# Patient Record
Sex: MALE | Race: WHITE | NOT HISPANIC OR LATINO | Employment: FULL TIME | URBAN - METROPOLITAN AREA
[De-identification: names, ages, dates, MRNs, and addresses within clinical notes are randomized per-mention and may not be internally consistent; named-entity substitution may affect disease eponyms.]

---

## 2017-09-27 ENCOUNTER — HISTORICAL (OUTPATIENT)
Dept: RADIOLOGY | Facility: HOSPITAL | Age: 47
End: 2017-09-27

## 2017-09-27 LAB — POC CREATININE: 1.3 MG/DL (ref 0.6–1.3)

## 2018-07-09 ENCOUNTER — HISTORICAL (OUTPATIENT)
Dept: ADMINISTRATIVE | Facility: HOSPITAL | Age: 48
End: 2018-07-09

## 2018-07-09 LAB — RAPID GROUP A STREP (OHS): NEGATIVE

## 2019-08-23 ENCOUNTER — HISTORICAL (OUTPATIENT)
Dept: LAB | Facility: HOSPITAL | Age: 49
End: 2019-08-23

## 2019-08-23 ENCOUNTER — HISTORICAL (OUTPATIENT)
Dept: ADMINISTRATIVE | Facility: HOSPITAL | Age: 49
End: 2019-08-23

## 2019-08-23 LAB — TESTOST SERPL-MCNC: 692 NG/DL (ref 300–1060)

## 2019-08-27 ENCOUNTER — HISTORICAL (OUTPATIENT)
Dept: CARDIOLOGY | Facility: HOSPITAL | Age: 49
End: 2019-08-27

## 2020-01-10 ENCOUNTER — HISTORICAL (OUTPATIENT)
Dept: LAB | Facility: HOSPITAL | Age: 50
End: 2020-01-10

## 2020-01-10 ENCOUNTER — HISTORICAL (OUTPATIENT)
Dept: ADMINISTRATIVE | Facility: HOSPITAL | Age: 50
End: 2020-01-10

## 2020-01-10 LAB — TESTOST SERPL-MCNC: 514 NG/DL (ref 300–1060)

## 2020-03-02 ENCOUNTER — HISTORICAL (OUTPATIENT)
Dept: RADIOLOGY | Facility: HOSPITAL | Age: 50
End: 2020-03-02

## 2020-10-05 ENCOUNTER — HISTORICAL (OUTPATIENT)
Dept: ADMINISTRATIVE | Facility: HOSPITAL | Age: 50
End: 2020-10-05

## 2020-10-05 LAB
ABS NEUT (OLG): 2.2 X10(3)/MCL (ref 2.1–9.2)
ALBUMIN SERPL-MCNC: 4.4 GM/DL (ref 3.4–5)
ALBUMIN/GLOB SERPL: 1.91 {RATIO} (ref 1.5–2.5)
ALP SERPL-CCNC: 51 UNIT/L (ref 38–126)
ALT SERPL-CCNC: 77 UNIT/L (ref 7–52)
AST SERPL-CCNC: 41 UNIT/L (ref 15–37)
BILIRUB SERPL-MCNC: 0.8 MG/DL (ref 0.2–1)
BILIRUBIN DIRECT+TOT PNL SERPL-MCNC: 0.2 MG/DL (ref 0–0.5)
BILIRUBIN DIRECT+TOT PNL SERPL-MCNC: 0.6 MG/DL
BUN SERPL-MCNC: 18 MG/DL (ref 7–18)
CALCIUM SERPL-MCNC: 9.5 MG/DL (ref 8.5–10.1)
CHLORIDE SERPL-SCNC: 103 MMOL/L (ref 98–107)
CO2 SERPL-SCNC: 28 MMOL/L (ref 21–32)
CREAT SERPL-MCNC: 1.08 MG/DL (ref 0.6–1.3)
ERYTHROCYTE [DISTWIDTH] IN BLOOD BY AUTOMATED COUNT: 14.1 % (ref 11.5–17)
GLOBULIN SER-MCNC: 2.4 GM/DL (ref 1.2–3)
GLUCOSE SERPL-MCNC: 71 MG/DL (ref 74–106)
HCT VFR BLD AUTO: 45.7 % (ref 42–52)
HGB BLD-MCNC: 15.4 GM/DL (ref 14–18)
LYMPHOCYTES # BLD AUTO: 0.8 X10(3)/MCL (ref 0.6–3.4)
LYMPHOCYTES NFR BLD AUTO: 20.9 % (ref 13–40)
MCH RBC QN AUTO: 30.2 PG (ref 27–31.2)
MCHC RBC AUTO-ENTMCNC: 34 GM/DL (ref 32–36)
MCV RBC AUTO: 90 FL (ref 80–94)
MONOCYTES # BLD AUTO: 0.6 X10(3)/MCL (ref 0.1–1.3)
MONOCYTES NFR BLD AUTO: 15.3 % (ref 0.1–24)
NEUTROPHILS NFR BLD AUTO: 63.8 % (ref 47–80)
PLATELET # BLD AUTO: 173 X10(3)/MCL (ref 130–400)
PMV BLD AUTO: 11.5 FL (ref 9.4–12.4)
POTASSIUM SERPL-SCNC: 4.6 MMOL/L (ref 3.5–5.1)
PROT SERPL-MCNC: 6.7 GM/DL (ref 6.4–8.2)
PSA SERPL-MCNC: 0.89 NG/ML (ref 0–3.5)
RBC # BLD AUTO: 5.1 X10(6)/MCL (ref 4.7–6.1)
SODIUM SERPL-SCNC: 139 MMOL/L (ref 136–145)
TESTOST SERPL-MCNC: 939 NG/DL (ref 300–1060)
WBC # SPEC AUTO: 3.6 X10(3)/MCL (ref 4.5–11.5)

## 2021-02-09 ENCOUNTER — HISTORICAL (OUTPATIENT)
Dept: ADMINISTRATIVE | Facility: HOSPITAL | Age: 51
End: 2021-02-09

## 2021-02-09 LAB
ESTRADIOL SERPL HS-MCNC: 70 PG/ML
TESTOST SERPL-MCNC: >1009 NG/DL (ref 300–1060)

## 2021-03-24 ENCOUNTER — HISTORICAL (OUTPATIENT)
Dept: ANESTHESIOLOGY | Facility: HOSPITAL | Age: 51
End: 2021-03-24

## 2021-08-04 ENCOUNTER — HISTORICAL (OUTPATIENT)
Dept: ADMINISTRATIVE | Facility: HOSPITAL | Age: 51
End: 2021-08-04

## 2021-08-04 LAB
ESTRADIOL SERPL HS-MCNC: 60 PG/ML
TESTOST SERPL-MCNC: >1009 NG/DL (ref 300–1060)

## 2021-09-15 ENCOUNTER — HISTORICAL (OUTPATIENT)
Dept: ADMINISTRATIVE | Facility: HOSPITAL | Age: 51
End: 2021-09-15

## 2021-10-01 ENCOUNTER — HISTORICAL (OUTPATIENT)
Dept: ADMINISTRATIVE | Facility: HOSPITAL | Age: 51
End: 2021-10-01

## 2021-10-01 ENCOUNTER — HISTORICAL (OUTPATIENT)
Dept: LAB | Facility: HOSPITAL | Age: 51
End: 2021-10-01

## 2021-10-01 LAB
ESTRADIOL SERPL HS-MCNC: 65 PG/ML
TESTOST SERPL-MCNC: 890 NG/DL (ref 300–1060)

## 2022-04-10 ENCOUNTER — HISTORICAL (OUTPATIENT)
Dept: ADMINISTRATIVE | Facility: HOSPITAL | Age: 52
End: 2022-04-10

## 2022-04-28 VITALS
DIASTOLIC BLOOD PRESSURE: 80 MMHG | BODY MASS INDEX: 38.58 KG/M2 | SYSTOLIC BLOOD PRESSURE: 146 MMHG | OXYGEN SATURATION: 96 % | HEIGHT: 67 IN | WEIGHT: 245.81 LBS

## 2022-05-03 NOTE — HISTORICAL [facility] CERNER
This is a historical note converted from Cerjimbo. Formatting and pictures may have been removed.  Please reference Cerjimbo for original formatting and attached multimedia. Chief Complaint  recheck from recent visit, c/o cough w/ thick mucus , fatigue  History of Present Illness  47-year-old?presents to clinic with a history of coughing, chest congestion,?thick white jelly like mucus?on and off for 3 weeks.? States seen in the clinic completed taking Z-Nitin as prescribed.? Patient also has taken amoxicillin?prescription from spouse 875 mg twice daily for 10 days.? No fever.? History of?adult onset asthma exercise induced, currently on albuterol not much help.? No chest pain, no palpitations  No?sore throat, mild nasal congestion and postnasal drip.? Appears concerned with ongoing symptoms. ?Does not smoke tobacco.? States feeling fatigued?with coughing?and feeling short of breath?with exercise.  Review of Systems  Constitutional : _ fatigue, No weakness, No Fever  HEENT : _Mild sore throat,?no ear pain  Neck : Negative except as documented in History of present illness  Respiratory : _Coughing, mucus production, shortness of breath, no wheezing  Cardiovascular : _No chest pain, no palpitations, no edema  Integumentary : _No skin rash or abnormal lesion  Physical Exam  Vitals & Measurements  T:?36.5? ?C (Oral)? HR:?80(Peripheral)? RR:?19? BP:?155/87? SpO2:?96%?  HT:?172?cm? HT:?172?cm? WT:?122?kg? WT:?122?kg? BMI:?41.24?  General : Alert, oriented,?no apparent distress, appears anxious, comfortable in exam chair, afebrile  Neck - supple, no lymphadenopathy  HENT :_Posterior pharynx and tonsils appear erythematous and swollen,?bilateral TM intact no redness  Respiratory :_Productive bronchial cough,?clear to auscultate bilateral, no bronchial breath sounds  Cardiovascular :_Regular rate and rhythm?without murmurs,?gallops or rubs  Gastrointestinal_full abdomen, soft, nontender,?nondistended?with normal bowel  sounds  Integumentary : No rashes  ?   After breathing treatment, air movements bilateral lung fields much improved  Assessment/Plan  1.?Asthmatic bronchitis  ? Review of the x-rays, concerns?early changes right lower lobe pneumonia.  Radiology final results will be monitored and reported  Duoneb Rx today. ?Claritin 10 mg for nasal congestion,?Delsym for cough and cold as needed  ?Continue albuterol inhaler 4-6 hours as needed  Monitor the symptoms closely, with any acute change in symptoms?call or return to clinic  Follow-up with primary M.D.  Ordered:  levoFLOXacin, 750 mg = 1 tab(s), Oral, q24hr, X 5 day(s), # 5 tab(s), 0 Refill(s), Pharmacy: [id number]  Office/Outpatient Visit Level 3 Established [id number] PC, Asthmatic bronchitis, [facility], 07/09/18 12:58:00 CDT  XR Chest 2 Views, Routine, 07/09/18 12:09:00 CDT, Dyspnea, None, Ambulatory, Patient Has IV?, Rad Type, Asthmatic bronchitis, Not Scheduled, 07/09/18 12:09:00 CDT  ?  Sore throat  ? Strep test negative  ?   Problem List/Past Medical History  Ongoing  Acid reflux  Asthma, exercise induced  H/O steroid therapy  High blood pressure  Hx of thyroid disease  Hypertension  Neuropathy  Obesity(  Probable Diagnosis  )  Historical  Able to lie down  Activity tolerance  Infection, skin, staph  Procedure/Surgical History  Drainage of Buttock Subcutaneous Tissue and Fascia, Open Approach (12/07/2015), Incision & Drainage Major (Right) (12/07/2015), Arthrocentesis, aspiration and/or injection, major joint or bursa (eg, shoulder, hip, knee, subacromial bursa); without ultrasound guidance (06/04/2015), Injection of steroid (06/04/2015), Injection of therapeutic substance into joint or ligament (06/04/2015), Injection Sacroiliac Joint (Right) (06/04/2015), X-ray, other and unspecified (06/04/2015), FESS - Functional endoscopic sinus surgery (2000).  Medications  BACLOFEN 10 MG TABS, 30 mg= 3 tab(s), Oral  chlorproMAZINE 10 mg oral tablet, 10 mg= 1 tab(s),  Oral, TID  Levaquin 750 mg oral tablet, 750 mg= 1 tab(s), Oral, q24hr  levothyroxine 100 mcg (0.1 mg) oral tablet  rosuvastatin 5 mg oral tablet  Allergies  cortisone?(Hiccups)  Social History  Alcohol - Denies Alcohol Use, 06/03/2015  Employment/School  Employed, Highest education level: None., 06/03/2015  Home/Environment  Lives with Spouse., 06/03/2015  Nutrition/Health  Type of diet: Low fat; low carb; high protein., 06/03/2015  Substance Abuse - Denies Substance Abuse, 11/28/2015  Tobacco - Denies Tobacco Use, 06/03/2015  Never smoker, 10/05/2015  Family History  Heart disease: Father.  Hyperlipidemia.: Negative: Father.  Hypertension.: Negative: Father.  Peripheral vascular disease.: Negative: Father.  Primary malignant neoplasm of brain: Negative: Grandmother.  Primary malignant neoplasm of lung: Negative: Grandmother.  Rheumatoid arthritis: Negative: Sister.  Immunizations  Vaccine Date Status   pneumococcal 23-polyvalent vaccine 12/09/2015 Given   Health Maintenance  Health Maintenance  ???Pending?(in the next year)  ??? ??OverDue  ??? ? ? ?Smoking Cessation due??10/05/16??and every 1??year(s)  ??? ??Due?  ??? ? ? ?Alcohol Misuse Screening due??07/09/18??and every 1??year(s)  ??? ? ? ?Aspirin Therapy for CVD Prevention due??07/09/18??and every 1??year(s)  ??? ? ? ?Depression Screening due??07/09/18??and every 1??year(s)  ??? ? ? ?Hypertension Management-Education due??07/09/18??and every 1??year(s)  ??? ? ? ?Lipid Screening due??07/09/18??Variable frequency  ??? ? ? ?Tetanus Vaccine due??07/09/18??and every 10??year(s)  ??? ??Due In Future?  ??? ? ? ?Hypertension Management-BMP not due until??09/27/18??and every 1??year(s)  ??? ? ? ?Influenza Vaccine not due until??10/02/18??and every 1??year(s)  ??? ? ? ?Diabetes Screening not due until??12/09/18??and every 3??year(s)  ??? ? ? ?Hypertension Management-Blood Pressure not due until??01/09/19??and every 6??month(s)  ???Satisfied?(in the past 1 year)  ???  ??Satisfied?  ??? ? ? ?Blood Pressure Screening on??07/09/18.??Satisfied by Juani Bird LPN  ??? ? ? ?Body Mass Index Check on??07/09/18.??Satisfied by Juani Bird LPN  ??? ? ? ?Hypertension Management-Blood Pressure on??07/09/18.??Satisfied by Juani Bird LPN  ??? ? ? ?Obesity Screening on??07/09/18.??Satisfied by Juani Bird LPN  ??? ? ? ?Tobacco Use Screening on??07/09/18.??Satisfied by Juani Bird LPN  ?  ?

## 2022-05-03 NOTE — HISTORICAL [facility] CERNER
This is a historical note converted from Zack. Formatting and pictures may have been removed.  Please reference Zack for original formatting and attached multimedia. Chief Complaint  B/P eval- He ran out > 1 week ago because he was given a 30 day supply with no refills.  History of Present Illness  Pt ran out of blood pressure medications; he has been out of his medications x 1 week.  His blood pressures are in the 120s-130s/70s.  He feels this medication has worked really well for him.  Review of Systems  No headaches/dizziness/lightheadedness.  He feels his vision is a little off; he feels he needs to donate blood.  No chest pain/pressure/tightness/dyspnea.  Physical Exam  Vitals & Measurements  T:?36.6? ?C (Oral)? HR:?84(Peripheral)? BP:?140/80?  HT:?172?cm? WT:?113.5?kg? BMI:?38.37?  General: He is well-developed well-nourished stocky white male in no apparent distress.  Chest: Clear to auscultation bilaterally.  CV: Regular rate and rhythm without murmurs rubs or gallops.  Bilateral lower extremities: Without edema.  Assessment/Plan  1.?Hypertension?I10  Pt is doing well on Losartan; 90 day refill sent to pharmacy.  Ordered:  Office/Outpatient Visit Level 3 Established [id number] PC, Hypertension, [facility], 05/22/20 8:43:00 CDT  ?  Orders:  losartan, 100 mg = 1 tab(s), Oral, Daily, # 90 tab(s), 1 Refill(s), Pharmacy: [id number], 172, cm, Height/Length Dosing, 05/22/20 8:12:00 CDT, 113.5, kg, Weight Dosing, 05/22/20 8:12:00 CDT  Clinic Follow-up PRN, 05/22/20 8:43:00 CDT, [facility], Future Order  Referrals  Clinic Follow-up PRN, 05/22/20 8:43:00 CDT, [facility], Future Order   Problem List/Past Medical History  Ongoing  Acid reflux  Asthma, exercise induced  H/O steroid therapy  Hypercholesteremia  Hypertension  Neuropathy  Obesity(  Probable Diagnosis  )  Umbilical hernia  Historical  Anabolic steroids  Carotid artery stenosis  HHD - Hypertensive heart disease  Infection, skin,  staph  Myalgia  HA - Obstructive sleep apnea  Weakness of both legs  Procedure/Surgical History  Catheter placement in coronary artery(s) for coronary angiography, including intraprocedural injection(s) for coronary angiography, imaging supervision and interpretation; with left heart catheterization including intraprocedural injection(s) for left cristina (08/27/2019)  Fluoroscopy of Left Heart using Low Osmolar Contrast (08/27/2019)  Fluoroscopy of Multiple Coronary Arteries using Low Osmolar Contrast (08/27/2019)  Left Heart Catheterization: Diagnostic Only (08/27/2019)  Measurement of Cardiac Sampling and Pressure, Left Heart, Percutaneous Approach (08/27/2019)  Drainage of Buttock Subcutaneous Tissue and Fascia, Open Approach (12/07/2015)  Incision & Drainage Major (Right) (12/07/2015)  Arthrocentesis, aspiration and/or injection, major joint or bursa (eg, shoulder, hip, knee, subacromial bursa); without ultrasound guidance (06/04/2015)  Injection of steroid (06/04/2015)  Injection of therapeutic substance into joint or ligament (06/04/2015)  Injection Sacroiliac Joint (Right) (06/04/2015)  X-ray, other and unspecified (06/04/2015)  FESS - Functional endoscopic sinus surgery (2000)  EGD   Medications  aspirin 81 mg oral Delayed Release (EC) tablet, 81 mg= 1 tab(s), Oral, Daily  Co-Q10 100 mg oral capsule, 300 mg= 3 cap(s), Oral, Daily  Fish Oil oral capsule, 1 cap(s), Oral, Daily  Flexeril 5 mg oral tablet, 10 mg= 2 tab(s), Oral, Daily, PRN  levothyroxine 100 mcg (0.1 mg) oral tablet, 1 tab, Oral, Daily  losartan 100 mg oral tablet, 100 mg= 1 tab(s), Oral, Daily, 1 refills  magnesium oxide 400 mg oral tablet, 400 mg= 1 tab(s), Oral, Daily  metFORMIN 1000 mg oral tablet, 1000 mg= 1 tab(s), Oral, BID,? ?Still taking, not as prescribed: Pt takes it once daily  PROAIR  (90 Base) MCG/ACT AERS, 1 puff, INH, q4hr, PRN  Viagra 100 mg oral tablet, 100 mg= 1 tab(s), Oral, Daily, PRN  Vitamin D3 10,000 intl units oral  capsule, 1 cap, Oral, Daily  vitamin E 800 intl units oral capsule, 1 cap, Oral, Daily  zinc (as gluconate) 50 mg oral tablet, 1 tab(s), Oral, Daily  Allergies  Zetia?(Severe depression)  amLODIPine?(Cough)  cortisone?(Hiccups)  lisinopril?(Cough)  statins?(Myalgia caused by statin)  Social History  Abuse/Neglect  No, 05/22/2020  Alcohol - Denies Alcohol Use, 06/03/2015  Current, 1-2 times per year, 12/30/2018  Employment/School  Employed, Highest education level: None., 06/03/2015  Home/Environment  Lives with Spouse., 06/03/2015  Nutrition/Health  Type of diet: Low fat; low carb; high protein., 06/03/2015  Substance Use - Denies Substance Abuse, 11/28/2015  Never, 08/27/2019  Tobacco - Denies Tobacco Use, 06/03/2015  Never (less than 100 in lifetime), N/A, 05/22/2020  Family History  Heart disease: Father.  Hyperlipidemia.: Mother and Father.  Hypertension: Mother and Sister.  Hypertension.: Negative: Father.  Peripheral vascular disease.: Negative: Father.  Primary malignant neoplasm of brain: Negative: Grandmother.  Primary malignant neoplasm of lung: Negative: Grandmother.  Rheumatoid arthritis: Negative: Sister.  Immunizations  Vaccine Date Status   influenza virus vaccine, inactivated 10/04/2019 Recorded   influenza virus vaccine, inactivated 11/26/2016 Recorded   pneumococcal 23-polyvalent vaccine 12/09/2015 Given   influenza virus vaccine, inactivated 10/25/2013 Recorded   Health Maintenance  Health Maintenance  ???Pending?(in the next year)  ??? ??OverDue  ??? ? ? ?Coronary Artery Disease Maintenance-Antiplatelet Agent Prescribed due??and every?  ??? ? ? ?Hypertension Management-BMP due??09/27/18??and every 1??year(s)  ??? ? ? ?Alcohol Misuse Screening due??01/01/20??and every 1??year(s)  ??? ??Due?  ??? ? ? ?ADL Screening due??05/22/20??and every 1??year(s)  ??? ? ? ?Colorectal Screening due??05/22/20??and every?  ??? ? ? ?Depression Screening due??05/22/20??and every?  ??? ? ? ?Diabetes Screening  due??05/22/20??and every?  ??? ? ? ?Hypertension Management-Education due??05/22/20??and every 1??year(s)  ??? ? ? ?Tetanus Vaccine due??05/22/20??and every 10??year(s)  ??? ??Due In Future?  ??? ? ? ?Aspirin Therapy for CVD Prevention not due until??08/27/20??and every 1??year(s)  ??? ? ? ?Obesity Screening not due until??01/01/21??and every 1??year(s)  ???Satisfied?(in the past 1 year)  ??? ??Satisfied?  ??? ? ? ?Aspirin Therapy for CVD Prevention on??08/27/19.  ??? ? ? ?Blood Pressure Screening on??05/22/20.??Satisfied by Deb Truong LPN  ??? ? ? ?Body Mass Index Check on??05/22/20.??Satisfied by Deb Truong LPN  ??? ? ? ?Coronary Artery Disease Maintenance-Antiplatelet Agent Prescribed on??08/27/19.  ??? ? ? ?Hypertension Management-Blood Pressure on??05/22/20.??Satisfied by Deb Truong LPN  ??? ? ? ?Influenza Vaccine on??10/04/19.??Satisfied by Deb Truong LPN  ??? ? ? ?Obesity Screening on??05/22/20.??Satisfied by Deb Truong LPN  ?

## 2022-07-06 ENCOUNTER — TELEPHONE (OUTPATIENT)
Dept: NEUROSURGERY | Facility: CLINIC | Age: 52
End: 2022-07-06

## 2022-07-06 NOTE — TELEPHONE ENCOUNTER
I returned the pt call who inquired about nerve surgery for his legs. I was not clear on the surgery that he reference therefore I mentioned the surgeries our surgeons do offer. I asked if He had been seen  And evaluated by a physician ? Imaging? The pt stated he spoke with his PCP but no imaging was done. He researched the particular surgery he would require. I informed the pt that it would be best to start with imaging to determine what kind of surgery or other alternatives he would benefit from. The pt stated he would speak with [facility] and call back  ----- Message from Jake Peralta sent at 7/6/2022  1:38 PM CDT -----  Name Of Caller: Landry        Provider Name: needs to establish care         Does patient feel the need to be seen today? no        Relationship to the Pt?: patient        Contact Preference?: [phone]        What is the nature of the call?:  Patient states that he would like to speak with someone in the office in regards to establishing care with neurosurgery for a damaged nerve in his leg

## 2022-08-05 DIAGNOSIS — R29.898 LEG WEAKNESS: ICD-10-CM

## 2022-08-05 DIAGNOSIS — R53.1 WEAKNESS: Primary | ICD-10-CM

## 2022-09-16 DIAGNOSIS — M50.30 DEGENERATION OF CERVICAL INTERVERTEBRAL DISC: Primary | ICD-10-CM

## 2023-05-05 DIAGNOSIS — R33.9 URINARY RETENTION: Primary | ICD-10-CM

## 2023-06-20 ENCOUNTER — OFFICE VISIT (OUTPATIENT)
Dept: UROLOGY | Facility: CLINIC | Age: 53
End: 2023-06-20
Payer: OTHER GOVERNMENT

## 2023-06-20 VITALS
WEIGHT: 223 LBS | SYSTOLIC BLOOD PRESSURE: 126 MMHG | HEART RATE: 66 BPM | DIASTOLIC BLOOD PRESSURE: 79 MMHG | BODY MASS INDEX: 35 KG/M2 | RESPIRATION RATE: 20 BRPM | HEIGHT: 67 IN | TEMPERATURE: 98 F | OXYGEN SATURATION: 95 %

## 2023-06-20 DIAGNOSIS — R33.9 URINARY RETENTION: ICD-10-CM

## 2023-06-20 LAB — POC RESIDUAL URINE VOLUME: 0 ML (ref 0–100)

## 2023-06-20 PROCEDURE — 51798 US URINE CAPACITY MEASURE: CPT | Mod: PBBFAC | Performed by: NURSE PRACTITIONER

## 2023-06-20 PROCEDURE — 99215 OFFICE O/P EST HI 40 MIN: CPT | Mod: PBBFAC | Performed by: NURSE PRACTITIONER

## 2023-06-20 PROCEDURE — 99213 OFFICE O/P EST LOW 20 MIN: CPT | Mod: S$PBB,,, | Performed by: NURSE PRACTITIONER

## 2023-06-20 PROCEDURE — 99213 PR OFFICE/OUTPT VISIT, EST, LEVL III, 20-29 MIN: ICD-10-PCS | Mod: S$PBB,,, | Performed by: NURSE PRACTITIONER

## 2023-06-20 NOTE — PROGRESS NOTES
Placed in room. Seen by Raymond Renteria NP. Spoke with patient. Bladder scan done at 0 ml. PSA now, refer to Dr. Anne.

## 2023-06-20 NOTE — PROGRESS NOTES
Chief Complaint:   Chief Complaint   Patient presents with    Urinary Retention       HPI:  Patient is a 52-year-old male referred to Urology for urinary retention due to hospitalization.  Patient had a recent back surgery in which he had an episode of had numbness to lower extremity and burning with to his lower extremities and his penis area once he was placed on various antibiotics , vancomycin was included, his numbness and penile burning and leg burning resolved.  Patient states he currently has no urologic symptoms of dysuria, urinary frequency, urgency, dribbling, hesitation, gross hematuria.  Patient however would like to be followed with PSAs and rectal exam per Dr. Turner.  Bladder scan 0 mL.    Allergies:  Review of patient's allergies indicates:   Allergen Reactions    Cortisone Other (See Comments)     Other reaction(s): Hiccups  hiccups      Amlodipine      Other reaction(s): Cough    Evolocumab      Other reaction(s): Body fluid retention, Fatigue, Sedation  Other reaction(s): Body fluid retention, Fatigue, Sedation      Ezetimibe Other (See Comments)     Other reaction(s): Severe depression  Depression      Lisinopril      Other reaction(s): Cough    Statins-hmg-coa reductase inhibitors      Other reaction(s): Myalgia caused by statin (finding)  Other reaction(s): Myalgia caused by statin       Medications:  Current Outpatient Medications   Medication Sig Dispense Refill    aspirin 81 MG Chew Take 81 mg by mouth.      clopidogreL (PLAVIX) 75 mg tablet daily.      cyclobenzaprine (FLEXERIL) 10 MG tablet Take 10 mg by mouth 3 (three) times daily as needed.      KLOR-CON M20 20 mEq tablet Take 20 mEq by mouth once daily.      levothyroxine (SYNTHROID) 100 MCG tablet Take 100 mcg by mouth.      losartan (COZAAR) 100 MG tablet Take 100 mg by mouth.      meloxicam (MOBIC) 15 MG tablet Take 15 mg by mouth.      testosterone cypionate (DEPOTESTOTERONE CYPIONATE) 200 mg/mL injection Inject 200 mg into the  muscle.      torsemide (DEMADEX) 20 MG Tab Take 20 mg by mouth once daily.      traZODone (DESYREL) 100 MG tablet Take 100 mg by mouth every evening.      danazoL (DANOCRINE) 100 MG capsule Take 100 mg by mouth once daily.       No current facility-administered medications for this visit.       Review of Systems:  General: No fever, chills, fatigability, or weight loss.  Skin: No rashes, itching, or changes in color or texture of skin.  Chest: Denies BUCIO, cyanosis, wheezing, cough, and sputum production.  Abdomen: Appetite fine. No weight loss. Denies diarrhea, abdominal pain, hematemesis, or blood in stool.  Musculoskeletal: No joint stiffness or swelling. Denies back pain.  : As above.  All other review of systems negative.    PMH:  Past Medical History:   Diagnosis Date    Asthma, exercise induced     Carotid artery stenosis     Essential (primary) hypertension     GERD (gastroesophageal reflux disease)     Hormone replacement therapy     Hypercholesteremia     Hypertensive heart disease     Neuropathy     Obesity, unspecified     HA (obstructive sleep apnea)     Umbilical hernia        PSH:  Past Surgical History:   Procedure Laterality Date    Drainage of Buttock Subcutaneous Tissue and Fascia, Open Approach   12/07/2015    ESOPHAGOGASTRODUODENOSCOPY      FUNCTIONAL ENDOSCOPIC SINUS SURGERY (FESS)  2000    Left Heart Catheterization: Diagnostic Only   08/27/2019       FamHx:  Family History   Problem Relation Age of Onset    Hyperlipidemia Mother     Hypertension Mother     Heart disease Father     Hyperlipidemia Father     Hypertension Father     Peripheral vascular disease Father     Hypertension Sister     Rheum arthritis Sister        SocHx:  Social History     Socioeconomic History    Marital status:    Tobacco Use    Smoking status: Never     Passive exposure: Never    Smokeless tobacco: Never   Substance and Sexual Activity    Alcohol use: Yes    Drug use: Never       Physical Exam:  Vitals:     06/20/23 0913   BP: 126/79   Pulse: 66   Resp: 20   Temp: 98.4 °F (36.9 °C)     General: A&Ox3, no apparent distress, no deformities  Neck: No masses, normal thyroid  Lungs: CTA jose luis, no use of accessory muscles  Heart: RRR, no arrhythmias  Abdomen: Soft, NT, ND, no masses, no hernias, no hepatosplenomegaly  Lymphatic: Neck and groin nodes negative  Skin: The skin is warm and dry. No jaundice.  Ext: No c/c/e.    GUMale:  Patient refused rectal exam.         Impression:  Urinary retention      Plan:  Instructed patient get PSA now and will follow-up with Dr. Turner to follow him.

## 2023-06-23 ENCOUNTER — LAB VISIT (OUTPATIENT)
Dept: LAB | Facility: HOSPITAL | Age: 53
End: 2023-06-23
Attending: NURSE PRACTITIONER
Payer: OTHER GOVERNMENT

## 2023-06-23 DIAGNOSIS — R33.9 URINARY RETENTION: ICD-10-CM

## 2023-06-23 LAB — PSA SERPL-MCNC: 1.38 NG/ML

## 2023-06-23 PROCEDURE — 84153 ASSAY OF PSA TOTAL: CPT

## 2023-06-23 PROCEDURE — 36415 COLL VENOUS BLD VENIPUNCTURE: CPT

## 2023-08-07 ENCOUNTER — OFFICE VISIT (OUTPATIENT)
Dept: UROLOGY | Facility: CLINIC | Age: 53
End: 2023-08-07
Payer: OTHER GOVERNMENT

## 2023-08-07 VITALS
HEIGHT: 67 IN | WEIGHT: 238.38 LBS | HEART RATE: 83 BPM | SYSTOLIC BLOOD PRESSURE: 122 MMHG | BODY MASS INDEX: 37.42 KG/M2 | OXYGEN SATURATION: 98 % | DIASTOLIC BLOOD PRESSURE: 77 MMHG | RESPIRATION RATE: 18 BRPM

## 2023-08-07 DIAGNOSIS — Z87.898 HISTORY OF URINARY RETENTION: ICD-10-CM

## 2023-08-07 DIAGNOSIS — R39.11 URINARY HESITANCY: Primary | ICD-10-CM

## 2023-08-07 LAB
BILIRUB SERPL-MCNC: NORMAL MG/DL
BLOOD URINE, POC: NORMAL
COLOR, POC UA: YELLOW
GLUCOSE UR QL STRIP: NORMAL
KETONES UR QL STRIP: NORMAL
LEUKOCYTE ESTERASE URINE, POC: NORMAL
NITRITE, POC UA: NORMAL
PH, POC UA: 6
PROTEIN, POC: NORMAL
SPECIFIC GRAVITY, POC UA: 1.01
UROBILINOGEN, POC UA: 0.2

## 2023-08-07 PROCEDURE — 81001 URINALYSIS AUTO W/SCOPE: CPT | Mod: PBBFAC | Performed by: UROLOGY

## 2023-08-07 PROCEDURE — 99214 OFFICE O/P EST MOD 30 MIN: CPT | Mod: PBBFAC | Performed by: UROLOGY

## 2023-08-07 PROCEDURE — 99213 OFFICE O/P EST LOW 20 MIN: CPT | Mod: S$PBB,,, | Performed by: UROLOGY

## 2023-08-07 PROCEDURE — 99213 PR OFFICE/OUTPT VISIT, EST, LEVL III, 20-29 MIN: ICD-10-PCS | Mod: S$PBB,,, | Performed by: UROLOGY

## 2023-08-07 RX ORDER — DOXYCYCLINE 100 MG/1
100 CAPSULE ORAL 2 TIMES DAILY
COMMUNITY
Start: 2023-07-11

## 2023-08-07 NOTE — PROGRESS NOTES
Pt seen by Dr. Turner. RTC 6 months with bladder scan. Pt education given both written and verbal.

## 2023-08-07 NOTE — PROGRESS NOTES
CC:  Prostate exam    HPI:  Landry Hudson is a 53 y.o. male seen for prostate exam.  Had a history of urinary retention associated with back surgery that he had in March of 2023.  He developed an infection after the surgery resulting in nerve issues and urinary retention.  After being on antibiotics those symptoms have improved.  He is now able to urinate and his bladder scan at the last visit a few months ago was zero.  Currently he will have the urge to have to urinate and then when he gets to the bathroom can not get the stream started.  He will push out some urine with Valsalva.  On other occasions he can urinate without any difficulty.  There is a burning sensation sometimes associated with this.  He also has some burning sensation in other areas as well.  He denies nocturia.  He was on Flomax initially however this caused severe side effects and he does not want to take it again.    Urinalysis:  Results for orders placed or performed in visit on 08/07/23   POCT URINE DIPSTICK WITH MICROSCOPE, AUTOMATED   Result Value Ref Range    Color, UA Yellow     Spec Grav UA 1.010     pH, UA 6.0     WBC, UA neg     Nitrite, UA neg     Protein, POC neg     Glucose, UA neg     Ketones, UA neg     Urobilinogen, UA 0.2     Bilirubin, POC neg     Blood, UA neg      Microscopic:  Negative      Lab Results:  PSA History:     Latest Reference Range & Units 10/05/20 11:49 06/23/23 10:02   PSA, Screen <=4.00 ng/mL 0.89 1.38     Results for orders placed or performed in visit on 10/01/21   Testosterone   Result Value Ref Range    Testosterone Total 890 300 - 1,060 ng/dL       ROS:  All systems reviewed and are negative except as documented in HPI and/or Assessment and Plan.     Patient Active Problem List:     Patient Active Problem List   Diagnosis    PNI (peripheral nerve injury)    Urinary retention        Past Medical History:  Past Medical History:   Diagnosis Date    Asthma, exercise induced     Carotid artery stenosis      Essential (primary) hypertension     GERD (gastroesophageal reflux disease)     Hormone replacement therapy     Hypercholesteremia     Hypertensive heart disease     Neuropathy     Obesity, unspecified     HA (obstructive sleep apnea)     Umbilical hernia         Past Surgical History:  Past Surgical History:   Procedure Laterality Date    Drainage of Buttock Subcutaneous Tissue and Fascia, Open Approach   12/07/2015    ESOPHAGOGASTRODUODENOSCOPY      FUNCTIONAL ENDOSCOPIC SINUS SURGERY (FESS)  2000    Left Heart Catheterization: Diagnostic Only   08/27/2019        Family History:  Family History   Problem Relation Age of Onset    Hyperlipidemia Mother     Hypertension Mother     Heart disease Father     Hyperlipidemia Father     Hypertension Father     Peripheral vascular disease Father     Hypertension Sister     Rheum arthritis Sister         Social History:  Social History     Socioeconomic History    Marital status:    Tobacco Use    Smoking status: Never     Passive exposure: Never    Smokeless tobacco: Never   Substance and Sexual Activity    Alcohol use: Yes    Drug use: Never        Allergies:  Review of patient's allergies indicates:   Allergen Reactions    Cortisone Other (See Comments)     Other reaction(s): Hiccups  hiccups      Amlodipine      Other reaction(s): Cough    Evolocumab      Other reaction(s): Body fluid retention, Fatigue, Sedation  Other reaction(s): Body fluid retention, Fatigue, Sedation      Ezetimibe Other (See Comments)     Other reaction(s): Severe depression  Depression      Lisinopril      Other reaction(s): Cough    Statins-hmg-coa reductase inhibitors      Other reaction(s): Myalgia caused by statin (finding)  Other reaction(s): Myalgia caused by statin        Objective:  Vitals:    08/07/23 1456   BP: 122/77   Pulse: 83   Resp: 18     General:  Well developed, well nourished adult male in no acute distress  Abdomen: Soft, nontender, no masses  Extremities:  No clubbing,  cyanosis, or edema  Neurologic:  Grossly intact  Musculoskeletal:  Normal tone  Penis:  Circumcised, no lesions  Scrotum:  No hydrocele  Testicles:  Nontender, no masses  Epididymis:  Normal without masses  Prostate exam:  Nontender, symmetrical, no nodules  Rectal:  Normal rectal tone    Assessment:  1. Urinary hesitancy  - POCT URINE DIPSTICK WITH MICROSCOPE, AUTOMATED    2. History of urinary retention     Plan:  This could be a component of overactive bladder from the nerves as they returned to function.  I hesitate to give him overactive bladder medication as this is tolerable for him at this time.  If this becomes more of a problem we will consider Myrbetriq.  The actual urinary retention has resolved.    Follow-up:  Six months for bladder scan.

## 2024-02-14 ENCOUNTER — OFFICE VISIT (OUTPATIENT)
Dept: UROLOGY | Facility: CLINIC | Age: 54
End: 2024-02-14
Payer: OTHER GOVERNMENT

## 2024-02-14 VITALS
BODY MASS INDEX: 38.21 KG/M2 | WEIGHT: 229.38 LBS | HEIGHT: 65 IN | RESPIRATION RATE: 20 BRPM | OXYGEN SATURATION: 98 % | TEMPERATURE: 98 F | SYSTOLIC BLOOD PRESSURE: 122 MMHG | DIASTOLIC BLOOD PRESSURE: 79 MMHG | HEART RATE: 69 BPM

## 2024-02-14 DIAGNOSIS — R39.11 URINARY HESITANCY: Primary | ICD-10-CM

## 2024-02-14 LAB
BILIRUB SERPL-MCNC: NORMAL MG/DL
BLOOD URINE, POC: NORMAL
COLOR, POC UA: YELLOW
GLUCOSE UR QL STRIP: NORMAL
KETONES UR QL STRIP: NORMAL
LEUKOCYTE ESTERASE URINE, POC: NORMAL
NITRITE, POC UA: NORMAL
PH, POC UA: 6.5
POC RESIDUAL URINE VOLUME: 1 ML (ref 0–100)
PROTEIN, POC: NORMAL
SPECIFIC GRAVITY, POC UA: 1.01
UROBILINOGEN, POC UA: 0.2

## 2024-02-14 PROCEDURE — 51798 US URINE CAPACITY MEASURE: CPT | Mod: PBBFAC | Performed by: UROLOGY

## 2024-02-14 PROCEDURE — 99215 OFFICE O/P EST HI 40 MIN: CPT | Mod: PBBFAC,25 | Performed by: UROLOGY

## 2024-02-14 PROCEDURE — 81001 URINALYSIS AUTO W/SCOPE: CPT | Mod: PBBFAC | Performed by: UROLOGY

## 2024-02-14 PROCEDURE — 99213 OFFICE O/P EST LOW 20 MIN: CPT | Mod: S$PBB,,, | Performed by: UROLOGY

## 2024-02-14 NOTE — PROGRESS NOTES
I saw and evaluated the patient with the resident.  I discussed with the resident and agree with the resident's history, physical, assessment, findings and care plan as documented in the resident's note.        Marisabel Davis DO  Urology  [facility]

## 2024-02-14 NOTE — PROGRESS NOTES
CC:  Prostate exam    HPI:  Landry Hudson is a 53 y.o. male seen for prostate exam.  Had a history of urinary retention associated with back surgery that he had in March of 2023.  He developed an infection after the surgery resulting in nerve issues and urinary retention.  No significant changes in symptoms from previous visit.  Currently he will have the urge to have to urinate and then when he gets to the bathroom can not get the stream started.  Will push out urine with Valsalva, sometimes with sensation of full emptying and sometimes with sensation partial emptying having to return 20 minutes later with improved emptying.  Often he has large amounts of dribbling.  Does intake large amounts of water up to 6 L per day.  Denies nocturia or hematuria.    Urinalysis:  02/14/2023    Component 09:56   Color, UA Yellow   Spec Grav UA 1.015   pH, UA 6.5   WBC, UA neg   Nitrite, UA neg   Protein, POC neg   Glucose, UA neg   Ketones, UA neg   Urobilinogen, UA 0.2   Bilirubin, POC neg   Blood, UA neg                   Microscopic:  Negative      Lab Results:    Results for orders placed or performed in visit on 10/01/21   Testosterone   Result Value Ref Range    Testosterone Total 890 300 - 1,060 ng/dL       Diagnostics: POCT Bladder scan with 1 ml post void     ROS:  All systems reviewed and are negative except as documented in HPI and/or Assessment and Plan.     Patient Active Problem List:     Patient Active Problem List   Diagnosis    Male hypogonadism    PNI (peripheral nerve injury)    Urinary retention    Hormone replacement therapy        Past Medical History:  Past Medical History:   Diagnosis Date    Asthma, exercise induced     Carotid artery stenosis     Essential (primary) hypertension     GERD (gastroesophageal reflux disease)     Hormone replacement therapy     Hypercholesteremia     Hypertensive heart disease     Neuropathy     Obesity, unspecified     HA (obstructive sleep apnea)     Umbilical hernia          Past Surgical History:  Past Surgical History:   Procedure Laterality Date    BACK SURGERY      Drainage of Buttock Subcutaneous Tissue and Fascia, Open Approach   12/07/2015    ESOPHAGOGASTRODUODENOSCOPY      FUNCTIONAL ENDOSCOPIC SINUS SURGERY (FESS)  2000    Left Heart Catheterization: Diagnostic Only   08/27/2019        Family History:  Family History   Problem Relation Age of Onset    Hyperlipidemia Mother     Hypertension Mother     Heart disease Father     Hyperlipidemia Father     Hypertension Father     Peripheral vascular disease Father     Hypertension Sister     Rheum arthritis Sister         Social History:  Social History     Socioeconomic History    Marital status:     Number of children: 1   Occupational History    Occupation: medically retired   Tobacco Use    Smoking status: Never     Passive exposure: Never    Smokeless tobacco: Never   Substance and Sexual Activity    Alcohol use: Not Currently    Drug use: Never        Allergies:  Review of patient's allergies indicates:   Allergen Reactions    Cortisone Other (See Comments)     Other reaction(s): Hiccups  hiccups      Amlodipine      Other reaction(s): Cough    Evolocumab      Other reaction(s): Body fluid retention, Fatigue, Sedation  Other reaction(s): Body fluid retention, Fatigue, Sedation      Ezetimibe Other (See Comments)     Other reaction(s): Severe depression  Depression      Lisinopril      Other reaction(s): Cough    Statins-hmg-coa reductase inhibitors      Other reaction(s): Myalgia caused by statin (finding)  Other reaction(s): Myalgia caused by statin        Objective:  Vitals:    02/14/24 0955   BP: 122/79   Pulse: 69   Resp: 20   Temp: 97.7 °F (36.5 °C)       General:  Well developed, well nourished adult male in no acute distress  Abdomen: Soft, nontender, no masses  Extremities:  No clubbing, cyanosis, or edema  Neurologic:  Grossly intact  Musculoskeletal:  Normal tone      Assessment:  1. Urinary hesitancy  -  POCT URINE DIPSTICK WITH MICROSCOPE, AUTOMATED     Plan:  Symptoms seem secondary to inadequate bladder contraction   Discussed initiation of medication included Urecholine, will need cystoscopy to rule out obstruction prior to initiation.  Patient was agreeable, discuss medication potential risks benefits and side effects associated.  Will schedule for ASAP cystoscopy    Follow-up:  ASAP cysto    Lavon Trinidad MD  [employer] [facility] Resident HO-3

## 2024-04-04 ENCOUNTER — PROCEDURE VISIT (OUTPATIENT)
Dept: UROLOGY | Facility: CLINIC | Age: 54
End: 2024-04-04
Payer: OTHER GOVERNMENT

## 2024-04-04 VITALS
TEMPERATURE: 98 F | OXYGEN SATURATION: 100 % | HEART RATE: 64 BPM | DIASTOLIC BLOOD PRESSURE: 66 MMHG | WEIGHT: 227.19 LBS | HEIGHT: 65 IN | BODY MASS INDEX: 37.85 KG/M2 | RESPIRATION RATE: 20 BRPM | SYSTOLIC BLOOD PRESSURE: 112 MMHG

## 2024-04-04 DIAGNOSIS — Z87.898 HISTORY OF URINARY RETENTION: ICD-10-CM

## 2024-04-04 DIAGNOSIS — R39.11 URINARY HESITANCY: Primary | ICD-10-CM

## 2024-04-04 LAB
BILIRUB SERPL-MCNC: NORMAL MG/DL
BLOOD URINE, POC: NORMAL
COLOR, POC UA: YELLOW
GLUCOSE UR QL STRIP: NORMAL
KETONES UR QL STRIP: 15
LEUKOCYTE ESTERASE URINE, POC: NORMAL
NITRITE, POC UA: NORMAL
PH, POC UA: 5.5
PROTEIN, POC: NORMAL
SPECIFIC GRAVITY, POC UA: 1.02
UROBILINOGEN, POC UA: 0.2

## 2024-04-04 PROCEDURE — 99499 UNLISTED E&M SERVICE: CPT | Mod: S$PBB,,, | Performed by: UROLOGY

## 2024-04-04 PROCEDURE — 52000 CYSTOURETHROSCOPY: CPT | Mod: S$PBB,,, | Performed by: UROLOGY

## 2024-04-04 PROCEDURE — 81001 URINALYSIS AUTO W/SCOPE: CPT | Mod: PBBFAC | Performed by: UROLOGY

## 2024-04-04 PROCEDURE — 52000 CYSTOURETHROSCOPY: CPT | Mod: PBBFAC | Performed by: UROLOGY

## 2024-04-04 RX ORDER — LIDOCAINE HYDROCHLORIDE 20 MG/ML
JELLY TOPICAL
Status: COMPLETED | OUTPATIENT
Start: 2024-04-04 | End: 2024-04-04

## 2024-04-04 RX ORDER — CIPROFLOXACIN 500 MG/1
500 TABLET ORAL
Status: COMPLETED | OUTPATIENT
Start: 2024-04-04 | End: 2024-04-04

## 2024-04-04 RX ADMIN — LIDOCAINE HYDROCHLORIDE: 20 JELLY TOPICAL at 11:04

## 2024-04-04 RX ADMIN — CIPROFLOXACIN 500 MG: 500 TABLET ORAL at 11:04

## 2024-04-04 NOTE — PROGRESS NOTES
Dr. Turner approved 16 Fr. Self caths to be given to patient. Education was provided to patient in AVS. Instructions were given to patient with bag of catheters.  Patient verbalized understanding.  [employer] document completed by Corie Corcoran to be faxed in order to provide catheter supplies to patient.

## 2024-04-04 NOTE — PROGRESS NOTES
Pt seen by Dr. Mata. Cysto performed in clinic. Consents signed and obtained by staff. Pt received medication per procedure protocol. Ciprofloxacin HCl tablet 500 mg & LIDOcaine HCl 2% urojet administered and tolerated well. RTC 1 mnth. Pt education given both written and verbal. Piece of matthew present  cath removed from bladder

## 2024-04-05 RX ORDER — BETHANECHOL CHLORIDE 25 MG/1
25 TABLET ORAL 3 TIMES DAILY
Qty: 90 TABLET | Refills: 11 | Status: SHIPPED | OUTPATIENT
Start: 2024-04-05 | End: 2025-04-05

## 2024-04-05 NOTE — PROCEDURES
CC:  Cystoscopy    HPI:  Landry Hudson is a 53 y.o. male here for a cystoscopy for history of urinary retention and urinary hesitancy.  He had back surgery in March of 2023.  Following that he had urinary retention.  Currently the catheter is out and he has been urinating.  He does have significant urinary hesitancy and has to Valsalva to get the stream started.  Also sometimes just has a dribbling stream.  His last bladder scan was 1 cc.  He has been on tamsulosin but did not feel that helped him at all.    Urinalysis:  Results for orders placed or performed in visit on 04/04/24   POCT URINE DIPSTICK WITH MICROSCOPE, AUTOMATED   Result Value Ref Range    Color, UA Yellow     Spec Grav UA 1.020     pH, UA 5.5     WBC, UA neg     Nitrite, UA neg     Protein, POC neg     Glucose, UA neg     Ketones, UA 15     Urobilinogen, UA 0.2     Bilirubin, POC neg     Blood, UA neg       Microscopic Urinalysis:  WBC:   None per HPF    RBC:    None per HPF    Bacteria:    None per HPF    Squamous epithelial cells:    None per HPF      Crystals:   None    ROS:  All systems reviewed and are negative except as documented in HPI and/or Assessment and Plan.     Patient Active Problem List:     Patient Active Problem List   Diagnosis    Male hypogonadism    PNI (peripheral nerve injury)    Urinary retention    Hormone replacement therapy        Past Medical History:  Past Medical History:   Diagnosis Date    Asthma, exercise induced     Carotid artery stenosis     Essential (primary) hypertension     GERD (gastroesophageal reflux disease)     Hormone replacement therapy     Hypercholesteremia     Hypertensive heart disease     Neuropathy     Obesity, unspecified     HA (obstructive sleep apnea)     Umbilical hernia         Past Surgical History:  Past Surgical History:   Procedure Laterality Date    BACK SURGERY      Drainage of Buttock Subcutaneous Tissue and Fascia, Open Approach   12/07/2015    ESOPHAGOGASTRODUODENOSCOPY       FUNCTIONAL ENDOSCOPIC SINUS SURGERY (FESS)  2000    Left Heart Catheterization: Diagnostic Only   08/27/2019        Family History:  Family History   Problem Relation Age of Onset    Hyperlipidemia Mother     Hypertension Mother     Heart disease Father     Hyperlipidemia Father     Hypertension Father     Peripheral vascular disease Father     Hypertension Sister     Rheum arthritis Sister         Social History:  Social History     Socioeconomic History    Marital status:     Number of children: 1   Occupational History    Occupation: medically retired   Tobacco Use    Smoking status: Never     Passive exposure: Never    Smokeless tobacco: Never   Substance and Sexual Activity    Alcohol use: Not Currently    Drug use: Never        Allergies:  Review of patient's allergies indicates:   Allergen Reactions    Cortisone Other (See Comments)     Other reaction(s): Hiccups  hiccups      Amlodipine      Other reaction(s): Cough    Evolocumab      Other reaction(s): Body fluid retention, Fatigue, Sedation  Other reaction(s): Body fluid retention, Fatigue, Sedation      Ezetimibe Other (See Comments)     Other reaction(s): Severe depression  Depression      Lisinopril      Other reaction(s): Cough    Statins-hmg-coa reductase inhibitors      Other reaction(s): Myalgia caused by statin (finding)  Other reaction(s): Myalgia caused by statin        Objective:  Vitals:    04/04/24 1142   BP: 112/66   Pulse: 64   Resp: 20   Temp: 97.8 °F (36.6 °C)     General:  Well developed, well nourished adult male in no acute distress  Abdomen: Soft, nontender, no masses  Extremities:  No clubbing, cyanosis, or edema  Neurologic:  Grossly intact  Musculoskeletal:  Normal tone    Cystoscopy:        - Penis:  Circumcised, no lesions         - Urethral meatus:  No strictures        - Urethra:  Normal without strictures or lesions           - Prostate:  The lateral lobes are close but not quite touching in the midline.        - Bladder  neck:  Normal        - Bladder:  No mucosal abnormalities.  The bladder is fairly distended today.  A foreign object is seen in the bladder which appears to be part of a latex catheter.  Using graspers this was grasped and extracted and indeed it was a piece of a catheter.        - Ureteral orifices:  On the trigone with clear efflux bilaterally    The patient tolerated the procedure well without complications.  He was given Cipro 500mg, one tablet in the clinic.   The urethra was anesthetized with 2% Lidocaine Jelly, Urojet.      Assessment:  1. Urinary hesitancy  - POCT URINE DIPSTICK WITH MICROSCOPE, AUTOMATED    2. History of urinary retention  - bethanechol (URECHOLINE) 25 MG Tab; Take 1 tablet (25 mg total) by mouth 3 (three) times daily.  Dispense: 90 tablet; Refill: 11     Plan:  I am going to place him on bethanechol to see if this will help with the urinary hesitancy.  I placed him on Urecholine as stated above.  We are also going to start him on self catheterization.  Was given instruction today and we will send a prescription to the supply company.    Follow-up:  One month for bladder scan.

## 2024-05-02 ENCOUNTER — OFFICE VISIT (OUTPATIENT)
Dept: UROLOGY | Facility: CLINIC | Age: 54
End: 2024-05-02
Payer: OTHER GOVERNMENT

## 2024-05-02 VITALS
SYSTOLIC BLOOD PRESSURE: 112 MMHG | DIASTOLIC BLOOD PRESSURE: 66 MMHG | RESPIRATION RATE: 20 BRPM | BODY MASS INDEX: 37.12 KG/M2 | HEIGHT: 65 IN | OXYGEN SATURATION: 100 % | WEIGHT: 222.81 LBS | HEART RATE: 65 BPM | TEMPERATURE: 98 F

## 2024-05-02 DIAGNOSIS — N31.9 NEUROGENIC BLADDER: ICD-10-CM

## 2024-05-02 DIAGNOSIS — R33.9 URINARY RETENTION: Primary | ICD-10-CM

## 2024-05-02 DIAGNOSIS — R39.11 URINARY HESITANCY: ICD-10-CM

## 2024-05-02 LAB
BILIRUB SERPL-MCNC: NEGATIVE MG/DL
BLOOD URINE, POC: NORMAL
COLOR, POC UA: YELLOW
GLUCOSE UR QL STRIP: NEGATIVE
KETONES UR QL STRIP: NORMAL
LEUKOCYTE ESTERASE URINE, POC: NORMAL
NITRITE, POC UA: NEGATIVE
PH, POC UA: 5.5
POC RESIDUAL URINE VOLUME: 13 ML (ref 0–100)
PROTEIN, POC: NEGATIVE
SPECIFIC GRAVITY, POC UA: 1.01
UROBILINOGEN, POC UA: 0.2

## 2024-05-02 PROCEDURE — 99215 OFFICE O/P EST HI 40 MIN: CPT | Mod: PBBFAC | Performed by: UROLOGY

## 2024-05-02 PROCEDURE — 99213 OFFICE O/P EST LOW 20 MIN: CPT | Mod: S$PBB,,, | Performed by: UROLOGY

## 2024-05-02 PROCEDURE — 51798 US URINE CAPACITY MEASURE: CPT | Mod: PBBFAC | Performed by: UROLOGY

## 2024-05-02 PROCEDURE — 81001 URINALYSIS AUTO W/SCOPE: CPT | Mod: PBBFAC | Performed by: UROLOGY

## 2024-05-02 NOTE — PROGRESS NOTES
CC:  Neurogenic bladder    HPI:  Landry Hudson is a 53 y.o. male seen for follow-up of neurogenic bladder.   He had back surgery in March of 2023. Following that he had urinary retention. Currently the catheter is out and he has been urinating. He does have significant urinary hesitancy and has to Valsalva to get the stream started and to empty. Also sometimes he just has a dribbling stream. Tamsulosin was no help so he stopped taking it.  I gave him Urecholine at the last visit but [facility].  His last renal imaging was a CT in October 2023 and they were normal.    Bladder scan residual:  13 ml    Urinalysis:  Results for orders placed or performed in visit on 05/02/24   POCT URINE DIPSTICK WITH MICROSCOPE, AUTOMATED   Result Value Ref Range    Color, UA Yellow     Spec Grav UA 1.010     pH, UA 5.5     WBC, UA Trace     Nitrite, UA Negative     Protein, POC Negative     Glucose, UA Negative     Ketones, UA Trace     Urobilinogen, UA 0.2     Bilirubin, POC Negative     Blood, UA Moderate      Microscopic Urinalysis:  WBC:   1-2 per HPF     RBC:    3-5 per HPF     Bacteria:    None per HPF     Squamous epithelial cells:    None per HPF      Crystals:   None    Lab Results:  PSA History:    10/05/20 11:49 06/23/23 10:02   0.89 1.38     ROS:  All systems reviewed and are negative except as documented in HPI and/or Assessment and Plan.     Patient Active Problem List:     Patient Active Problem List   Diagnosis    Male hypogonadism    PNI (peripheral nerve injury)    Urinary retention    Hormone replacement therapy        Past Medical History:  Past Medical History:   Diagnosis Date    Asthma, exercise induced     Carotid artery stenosis     Essential (primary) hypertension     GERD (gastroesophageal reflux disease)     Hormone replacement therapy     Hypercholesteremia     Hypertensive heart disease     Neuropathy     Obesity, unspecified     HA (obstructive sleep apnea)      Umbilical hernia         Past Surgical History:  Past Surgical History:   Procedure Laterality Date    BACK SURGERY      Drainage of Buttock Subcutaneous Tissue and Fascia, Open Approach   12/07/2015    ESOPHAGOGASTRODUODENOSCOPY      FUNCTIONAL ENDOSCOPIC SINUS SURGERY (FESS)  2000    Left Heart Catheterization: Diagnostic Only   08/27/2019        Family History:  Family History   Problem Relation Name Age of Onset    Hyperlipidemia Mother      Hypertension Mother      Heart disease Father      Hyperlipidemia Father      Hypertension Father      Peripheral vascular disease Father      Hypertension Sister      Rheum arthritis Sister          Social History:  Social History     Socioeconomic History    Marital status:     Number of children: 1   Occupational History    Occupation: medically retired   Tobacco Use    Smoking status: Never     Passive exposure: Never    Smokeless tobacco: Never   Substance and Sexual Activity    Alcohol use: Not Currently    Drug use: Never        Allergies:  Review of patient's allergies indicates:   Allergen Reactions    Cortisone Other (See Comments)     Other reaction(s): Hiccups  hiccups      Amlodipine      Other reaction(s): Cough    Evolocumab      Other reaction(s): Body fluid retention, Fatigue, Sedation  Other reaction(s): Body fluid retention, Fatigue, Sedation      Ezetimibe Other (See Comments)     Other reaction(s): Severe depression  Depression      Lisinopril      Other reaction(s): Cough    Statins-hmg-coa reductase inhibitors      Other reaction(s): Myalgia caused by statin (finding)  Other reaction(s): Myalgia caused by statin        Objective:  Vitals:    05/02/24 1045   BP: 112/66   Pulse: 65   Resp: 20   Temp: 98 °F (36.7 °C)     General:  Well developed, well nourished adult male in no acute distress  Abdomen: Soft, nontender, no masses  Extremities:  No clubbing, cyanosis, or edema  Neurologic:  Grossly intact  Musculoskeletal:  Normal tone      Assessment:  1. Urinary retention  - POCT URINE DIPSTICK WITH MICROSCOPE, AUTOMATED  - POCT Bladder Scan    2. Urinary hesitancy     Plan:  Continue intermittent catheterization three times a day.  He will continue Urecholine for now.  If he determines that this is not really helping he will stop taking it.  See 1 above.     Follow-up:  Renal ultrasound in three months and follow-up after.

## 2024-05-02 NOTE — PROGRESS NOTES
Placed in room. Seen by Dr. Prasad. Spoke with patient. Bladder scan at 13 mls. RTC in 3 months with a renal U/S.

## 2024-05-06 DIAGNOSIS — M25.519 PAIN IN UNSPECIFIED SHOULDER: Primary | ICD-10-CM

## 2024-06-19 ENCOUNTER — OFFICE VISIT (OUTPATIENT)
Dept: ORTHOPEDICS | Facility: CLINIC | Age: 54
End: 2024-06-19
Payer: OTHER GOVERNMENT

## 2024-06-19 ENCOUNTER — HOSPITAL ENCOUNTER (OUTPATIENT)
Dept: RADIOLOGY | Facility: CLINIC | Age: 54
Discharge: HOME | End: 2024-06-19
Attending: ORTHOPAEDIC SURGERY
Payer: OTHER GOVERNMENT

## 2024-06-19 VITALS
HEART RATE: 67 BPM | HEIGHT: 65 IN | BODY MASS INDEX: 38.49 KG/M2 | SYSTOLIC BLOOD PRESSURE: 121 MMHG | DIASTOLIC BLOOD PRESSURE: 77 MMHG | WEIGHT: 231 LBS

## 2024-06-19 DIAGNOSIS — M25.511 BILATERAL SHOULDER PAIN, UNSPECIFIED CHRONICITY: ICD-10-CM

## 2024-06-19 DIAGNOSIS — M19.012 PRIMARY OSTEOARTHRITIS OF LEFT SHOULDER: ICD-10-CM

## 2024-06-19 DIAGNOSIS — M19.011 PRIMARY OSTEOARTHRITIS OF RIGHT SHOULDER: Primary | ICD-10-CM

## 2024-06-19 DIAGNOSIS — M25.512 BILATERAL SHOULDER PAIN, UNSPECIFIED CHRONICITY: ICD-10-CM

## 2024-06-19 PROCEDURE — 99203 OFFICE O/P NEW LOW 30 MIN: CPT | Mod: ,,, | Performed by: ORTHOPAEDIC SURGERY

## 2024-06-19 PROCEDURE — 73030 X-RAY EXAM OF SHOULDER: CPT | Mod: 50,,, | Performed by: ORTHOPAEDIC SURGERY

## 2024-06-19 RX ORDER — TADALAFIL 20 MG/1
20 TABLET ORAL
COMMUNITY
Start: 2024-05-14

## 2024-06-19 RX ORDER — AMOXICILLIN 500 MG
CAPSULE ORAL DAILY
COMMUNITY

## 2024-06-19 NOTE — PROGRESS NOTES
"Chief Complaint:   Chief Complaint   Patient presents with    Left Shoulder - Pain     **[facility]** BILATERAL SHOULDER PAIN, started hurting decades ago, "completely bone on bone" haven't been able to raise arms above eye level in over 15 years, MRI done in 2022, no prior injections/PT/falls, was a  for 40 years and damage from wear and tear, TAKE PRECAUTION TO INFECTION OF HARDWARE FOR SHOULDERS SINCE BACK SURGERY INFECTION, brought in labs from clearance of infections, TIGHTNESS WHEN EXTENDING LEFT ARM IN THE DELTOID, BICEP, AND ELBOW    Right Shoulder - Pain     here to discuss sx- anytime as soon as possible, got cardiac clearance a couple weeks ago         Consulting Physician: Order, Paper    History of present illness:    he  is a pleasant 53 y.o. year old male  who presents with a longstanding history of bilateral shoulder pain due to known osteoarthritis. His pain is global in each shoulder. It is worse with any activity. He had limited ROM. He's tried injections with temporary relief. Using Voltaren gel.     Past Medical History:   Diagnosis Date    Asthma, exercise induced     Carotid artery stenosis     Essential (primary) hypertension     GERD (gastroesophageal reflux disease)     Hormone replacement therapy     Hypercholesteremia     Hypertensive heart disease     Neuropathy     Obesity, unspecified     HA (obstructive sleep apnea)     Umbilical hernia        Past Surgical History:   Procedure Laterality Date    BACK SURGERY  03/2023    infection on incision site- been on abx for a year    Drainage of Buttock Subcutaneous Tissue and Fascia, Open Approach   12/07/2015    ESOPHAGOGASTRODUODENOSCOPY      FUNCTIONAL ENDOSCOPIC SINUS SURGERY (FESS)  2000    Left Heart Catheterization: Diagnostic Only   08/27/2019       Current Outpatient Medications   Medication Sig    ascorbic acid, vitamin C, (VITAMIN C) 100 MG tablet Take 100 mg by mouth once daily.    aspirin 81 MG Chew Take 81 mg by mouth.    " bethanechol (URECHOLINE) 25 MG Tab Take 1 tablet (25 mg total) by mouth 3 (three) times daily.    clopidogreL (PLAVIX) 75 mg tablet daily.    COQ-10 100 mg capsule Take 200 mg by mouth 2 (two) times daily.    cyclobenzaprine (FLEXERIL) 10 MG tablet Take 10 mg by mouth 3 (three) times daily as needed.    doxycycline (VIBRAMYCIN) 100 MG Cap Take 100 mg by mouth 2 (two) times daily.    KLOR-CON M20 20 mEq tablet Take 20 mEq by mouth once daily.    levothyroxine (SYNTHROID) 100 MCG tablet Take 100 mcg by mouth.    losartan (COZAAR) 100 MG tablet Take 100 mg by mouth.    omega-3 fatty acids/fish oil (FISH OIL-OMEGA-3 FATTY ACIDS) 300-1,000 mg capsule Take by mouth once daily.    plecanatide (TRULANCE) 3 mg Tab Take 3 mg by mouth.    tadalafiL (CIALIS) 20 MG Tab Take 20 mg by mouth.    testosterone cypionate (DEPOTESTOTERONE CYPIONATE) 200 mg/mL injection Inject 200 mg into the muscle.    torsemide (DEMADEX) 20 MG Tab Take 20 mg by mouth once daily.    traZODone (DESYREL) 100 MG tablet Take 100 mg by mouth every evening.     No current facility-administered medications for this visit.       Review of patient's allergies indicates:   Allergen Reactions    Cortisone Other (See Comments)     Other reaction(s): Hiccups  hiccups      Amlodipine      Other reaction(s): Cough    Evolocumab      Other reaction(s): Body fluid retention, Fatigue, Sedation  Other reaction(s): Body fluid retention, Fatigue, Sedation      Ezetimibe Other (See Comments)     Other reaction(s): Severe depression  Depression      Lisinopril      Other reaction(s): Cough    Statins-hmg-coa reductase inhibitors      Other reaction(s): Myalgia caused by statin (finding)  Other reaction(s): Myalgia caused by statin       Family History   Problem Relation Name Age of Onset    Hyperlipidemia Mother      Hypertension Mother      Heart disease Father      Hyperlipidemia Father      Hypertension Father      Peripheral vascular disease Father      Hypertension  "Sister      Rheum arthritis Sister         Social History     Socioeconomic History    Marital status:     Number of children: 1   Occupational History    Occupation: medically retired   Tobacco Use    Smoking status: Never     Passive exposure: Never    Smokeless tobacco: Never   Substance and Sexual Activity    Alcohol use: Not Currently    Drug use: Never    Sexual activity: Yes     Partners: Female       Review of Systems:    Constitution:   Denies chills, fever, and sweats.  HENT:   Denies headaches or blurry vision.  Cardiovascular:  Denies chest pain or irregular heart beat.  Respiratory:   Denies cough or shortness of breath.  Gastrointestinal:  Denies abdominal pain, nausea, or vomiting.  Musculoskeletal:   Denies muscle cramps.  Neurological:   Denies dizziness or focal weakness.  Psychiatric/Behavior: Normal mental status.  Hematology/Lymph:  Denies bleeding problem or easy bruising/bleeding.  Skin:    Denies rash or suspicious lesions.    Examination:    Vital Signs:    Vitals:    06/19/24 0917   BP: 121/77   Pulse: 67   Weight: 104.8 kg (231 lb)   Height: 5' 5" (1.651 m)       Body mass index is 38.44 kg/m².    Constitution:   Well-developed, well nourished patient in no acute distress.  Neurological:   Alert and oriented x 3 and cooperative to examination.     Psychiatric/Behavior: Normal mental status.  Respiratory:   No shortness of breath.  Cards:    Pulses palpable and symmetric, brisk cap refill   Eyes:    Extraoccular muscles intact  Skin:    No scars, rash or suspicious lesions.    MSK:     Shoulder Exam:                   Right        Left  Skin:                                   Normal     Normal  AC joint tenderness:           None         None  Forward Flexion:                45               45  Abduction:                          30              45  External Rotation:               45              45  Internal Rotation:                80             80      Imaging: X-rays ordered " and images interpreted today personally by me of 4 views of each shoulder show advanced osteoarthritis.        Assessment: Primary osteoarthritis of right shoulder  -     X-Ray Shoulder 2 or more views Bilat; Future; Expected date: 06/19/2024    Primary osteoarthritis of left shoulder  -     Ambulatory referral/consult to Orthopedics        Plan:  I think his best surgical option would be a total shoulder versus sonja arthroplasty.  He has a previous provider in [place] that he has seen for a ream and run procedure.  He is going to follow up for surgical intervention with that provider.  I will see him back if he has any issues    Niels Muñoz MD personally performed the services described in this documentation, including but not limited to patient's history, physical examination, and assessment and plan of care. All medical record entries made by ELVIRA Holland were performed at his direction and in his presence. The medical record was reviewed and is accurate and complete.

## 2024-06-29 NOTE — PROGRESS NOTES
"Follow-up (Review testosterone  levels need new lab orders for donation)       HPI:    Pt needs order for labs for hypogonadism.  Patient is currently on 0.6 cc of testosterone weekly.  Pt needs an updated order to give blood at [facility]; he donates every 10-12 weeks  He had back surgery in March 2023, complicated by postoperative infection at surgical site. He has to self cath. He has chronic constipation for which he takes Trulance.   Patient will be scheduled for bilateral shoulder replacements soon.  He sees Dr Mckenzie; saw him recently.       Current Outpatient Medications   Medication Instructions    ascorbic acid (vitamin C) (VITAMIN C) 100 mg, Oral, Daily    aspirin 81 mg    bethanechol (URECHOLINE) 25 mg, Oral, 3 times daily    clopidogreL (PLAVIX) 75 mg tablet daily.    COQ-10 200 mg, Oral, 2 times daily    cyclobenzaprine (FLEXERIL) 10 mg, Oral, 3 times daily PRN    doxycycline (VIBRAMYCIN) 100 mg, 2 times daily    KLOR-CON M20 20 mEq tablet 20 mEq, Oral, Daily    levothyroxine (SYNTHROID) 100 mcg, Oral    losartan (COZAAR) 100 mg, Oral    omega-3 fatty acids/fish oil (FISH OIL-OMEGA-3 FATTY ACIDS) 300-1,000 mg capsule Oral, Daily    plecanatide (TRULANCE) 3 mg, Oral    tadalafiL (CIALIS) 20 mg, Oral    testosterone cypionate (DEPOTESTOTERONE CYPIONATE) 200 mg, Intramuscular    torsemide (DEMADEX) 20 mg, Oral, Daily    traZODone (DESYREL) 100 mg, Oral, Nightly         ROS:    Patient denies headaches, dizziness or lightheadedness.    Patient denies chest pain, pressure, tightness, palpitations or shortness for breath.  Patient denies coughing, wheezing or problems with breathing.  Patient denies any nausea vomiting or abdominal pain.  Patient reports numbness from his waist down.          PE:    ..Visit Vitals  /71   Pulse 73   Temp 98.8 °F (37.1 °C)   Ht 5' 5" (1.651 m)   Wt 106.2 kg (234 lb 1.6 oz)   SpO2 98%   BMI 38.96 kg/m²        General: He is well-developed well-nourished white male in no " apparent distress.  He is alert and oriented.  Chest: Clear to auscultation bilaterally.   Chest wall:  No masses, lumps or tenderness to palpation.    CV: Regular rate rhythm without murmurs rubs gallops.    Bilateral lower extremities: Without edema.        1. Male hypogonadism  Overview:  See ' Hormone replacement therapy '    07/02/2024:  Patient is currently on testosterone 0.6 cc IM weekly.    Lab orders given; patient will have drawn at [facility].       2. Essential (primary) hypertension  Overview:  Controlled; continue losartan 100 mg daily.  Followed by cardio, Dr Mckenzie.      3. Hypercholesteremia  Overview:  07/02/2024:  Patient has been intolerant of statins and Repatha.  Patient is about to start another medication.      4. Urinary retention  Overview:  07/02/2024: Patient has urinary retention secondary to back surgery with postoperative infection.    Patient self caths.                ..Follow up in about 6 months (around 1/2/2025).       Future Appointments   Date Time Provider Department Center   7/15/2024  9:00 AM [address] [place]   8/5/2024 10:15 AM Marisabel Turner DO [facility] [place]

## 2024-07-02 ENCOUNTER — OFFICE VISIT (OUTPATIENT)
Dept: PRIMARY CARE CLINIC | Facility: CLINIC | Age: 54
End: 2024-07-02
Payer: OTHER GOVERNMENT

## 2024-07-02 VITALS
BODY MASS INDEX: 39.01 KG/M2 | HEART RATE: 73 BPM | OXYGEN SATURATION: 98 % | TEMPERATURE: 99 F | HEIGHT: 65 IN | DIASTOLIC BLOOD PRESSURE: 71 MMHG | WEIGHT: 234.13 LBS | SYSTOLIC BLOOD PRESSURE: 125 MMHG

## 2024-07-02 DIAGNOSIS — E78.00 HYPERCHOLESTEREMIA: ICD-10-CM

## 2024-07-02 DIAGNOSIS — E29.1 MALE HYPOGONADISM: Primary | ICD-10-CM

## 2024-07-02 DIAGNOSIS — R33.9 URINARY RETENTION: ICD-10-CM

## 2024-07-02 DIAGNOSIS — I10 ESSENTIAL (PRIMARY) HYPERTENSION: ICD-10-CM

## 2024-07-02 PROCEDURE — 99214 OFFICE O/P EST MOD 30 MIN: CPT | Mod: ,,, | Performed by: FAMILY MEDICINE

## 2024-07-15 ENCOUNTER — HOSPITAL ENCOUNTER (OUTPATIENT)
Dept: RADIOLOGY | Facility: HOSPITAL | Age: 54
Discharge: HOME | End: 2024-07-15
Attending: UROLOGY
Payer: OTHER GOVERNMENT

## 2024-07-15 DIAGNOSIS — N31.9 NEUROGENIC BLADDER: ICD-10-CM

## 2024-07-15 PROCEDURE — 76775 US EXAM ABDO BACK WALL LIM: CPT | Mod: TC

## 2024-08-05 ENCOUNTER — LAB VISIT (OUTPATIENT)
Dept: LAB | Facility: HOSPITAL | Age: 54
End: 2024-08-05
Attending: UROLOGY
Payer: OTHER GOVERNMENT

## 2024-08-05 ENCOUNTER — OFFICE VISIT (OUTPATIENT)
Dept: UROLOGY | Facility: CLINIC | Age: 54
End: 2024-08-05
Payer: OTHER GOVERNMENT

## 2024-08-05 DIAGNOSIS — R33.9 URINARY RETENTION: ICD-10-CM

## 2024-08-05 DIAGNOSIS — R33.9 URINARY RETENTION: Primary | ICD-10-CM

## 2024-08-05 LAB
BILIRUB SERPL-MCNC: NEGATIVE MG/DL
BLOOD URINE, POC: NEGATIVE
COLOR, POC UA: YELLOW
GLUCOSE UR QL STRIP: NEGATIVE
KETONES UR QL STRIP: NEGATIVE
LEUKOCYTE ESTERASE URINE, POC: NORMAL
NITRITE, POC UA: NEGATIVE
PH, POC UA: 5.5
POC RESIDUAL URINE VOLUME: 10 ML (ref 0–100)
PROTEIN, POC: NEGATIVE
PSA SERPL-MCNC: 3.87 NG/ML
SPECIFIC GRAVITY, POC UA: 1.01
UROBILINOGEN, POC UA: 0.2

## 2024-08-05 PROCEDURE — 84153 ASSAY OF PSA TOTAL: CPT

## 2024-08-05 PROCEDURE — 99214 OFFICE O/P EST MOD 30 MIN: CPT | Mod: S$PBB,,, | Performed by: UROLOGY

## 2024-08-05 PROCEDURE — 51798 US URINE CAPACITY MEASURE: CPT | Mod: PBBFAC | Performed by: UROLOGY

## 2024-08-05 PROCEDURE — 99213 OFFICE O/P EST LOW 20 MIN: CPT | Mod: PBBFAC,25 | Performed by: UROLOGY

## 2024-08-05 PROCEDURE — 81001 URINALYSIS AUTO W/SCOPE: CPT | Mod: PBBFAC | Performed by: UROLOGY

## 2024-08-05 PROCEDURE — 36415 COLL VENOUS BLD VENIPUNCTURE: CPT

## 2024-08-06 ENCOUNTER — TELEPHONE (OUTPATIENT)
Dept: UROLOGY | Facility: CLINIC | Age: 54
End: 2024-08-06
Payer: OTHER GOVERNMENT

## 2024-08-06 DIAGNOSIS — R97.20 RISING PSA LEVEL: Primary | ICD-10-CM

## 2025-02-20 ENCOUNTER — OFFICE VISIT (OUTPATIENT)
Dept: PRIMARY CARE CLINIC | Facility: CLINIC | Age: 55
End: 2025-02-20
Payer: OTHER GOVERNMENT

## 2025-02-20 VITALS
HEART RATE: 85 BPM | HEIGHT: 65 IN | OXYGEN SATURATION: 95 % | WEIGHT: 236.69 LBS | SYSTOLIC BLOOD PRESSURE: 120 MMHG | TEMPERATURE: 98 F | DIASTOLIC BLOOD PRESSURE: 73 MMHG | BODY MASS INDEX: 39.43 KG/M2

## 2025-02-20 DIAGNOSIS — E03.9 ACQUIRED HYPOTHYROIDISM: ICD-10-CM

## 2025-02-20 DIAGNOSIS — I11.9 HYPERTENSIVE HEART DISEASE WITHOUT HEART FAILURE: ICD-10-CM

## 2025-02-20 DIAGNOSIS — E29.1 MALE HYPOGONADISM: Primary | ICD-10-CM

## 2025-02-20 DIAGNOSIS — I10 ESSENTIAL (PRIMARY) HYPERTENSION: ICD-10-CM

## 2025-02-20 PROBLEM — D47.2 MGUS (MONOCLONAL GAMMOPATHY OF UNKNOWN SIGNIFICANCE): Status: ACTIVE | Noted: 2025-02-20

## 2025-02-20 PROBLEM — Z00.00 ENCOUNTER FOR WELLNESS EXAMINATION IN ADULT: Status: ACTIVE | Noted: 2025-02-20

## 2025-02-20 PROCEDURE — 99214 OFFICE O/P EST MOD 30 MIN: CPT | Mod: ,,, | Performed by: FAMILY MEDICINE

## 2025-02-20 RX ORDER — MELOXICAM 15 MG/1
TABLET ORAL
COMMUNITY
Start: 2024-05-14

## 2025-02-20 NOTE — PROGRESS NOTES
No chief complaint on file.         HPI:      Current Outpatient Medications   Medication Instructions    alirocumab (PRALUENT PEN) 75 mg/mL PnIj Subcutaneous    ascorbic acid (vitamin C) (VITAMIN C) 100 mg, Oral, Daily    aspirin 81 mg    bethanechol (URECHOLINE) 25 mg, Oral, 3 times daily    clopidogreL (PLAVIX) 75 mg tablet daily.    COQ-10 200 mg, Oral, 2 times daily    cyclobenzaprine (FLEXERIL) 10 mg, Oral, 3 times daily PRN    doxycycline (VIBRAMYCIN) 100 mg, 2 times daily    KLOR-CON M20 20 mEq tablet 20 mEq, Oral, Daily    levothyroxine (SYNTHROID) 100 mcg, Oral    losartan (COZAAR) 100 mg, Oral    omega-3 fatty acids/fish oil (FISH OIL-OMEGA-3 FATTY ACIDS) 300-1,000 mg capsule Oral, Daily    plecanatide (TRULANCE) 3 mg, Oral    tadalafiL (CIALIS) 20 mg, Oral    testosterone cypionate (DEPOTESTOTERONE CYPIONATE) 200 mg, Intramuscular    torsemide (DEMADEX) 20 mg, Oral, Daily    traZODone (DESYREL) 100 mg, Oral, Nightly         ROS:      PE:    ..There were no vitals taken for this visit.     General: He is well-developed well-nourished stocky white male in no apparent distress.  He is alert and oriented.        {There are no diagnoses linked to this encounter. (Refresh or delete this SmartLink)}          ..No follow-ups on file.       Future Appointments   Date Time Provider Department Center   2/20/2025  4:00 PM Tyler Cheng MD [facility] [place]

## 2025-02-20 NOTE — PROGRESS NOTES
..Annual Exam (Labs done at [facility])       HPI:           The patient's Health Maintenance was reviewed and the following appears to be due at this time:   Health Maintenance Due   Topic Date Due    Hepatitis C Screening  Never done    HIV Screening  Never done    TETANUS VACCINE  Never done    High Dose Statin  Never done    Influenza Vaccine (1) 09/01/2024    COVID-19 Vaccine (1 - 2024-25 season) Never done       ..  Past Medical History:   Diagnosis Date    Asthma, exercise induced     Carotid artery stenosis     Essential (primary) hypertension     GERD (gastroesophageal reflux disease)     Hormone replacement therapy     Hypercholesteremia     Hypertensive heart disease     Neuropathy     Obesity, unspecified     HA (obstructive sleep apnea)     Umbilical hernia           ..  Past Surgical History:   Procedure Laterality Date    BACK SURGERY  03/2023    infection on incision site- been on abx for a year    Drainage of Buttock Subcutaneous Tissue and Fascia, Open Approach   12/07/2015    ESOPHAGOGASTRODUODENOSCOPY      FUNCTIONAL ENDOSCOPIC SINUS SURGERY (FESS)  2000    Left Heart Catheterization: Diagnostic Only   08/27/2019    MANIPULATION OF SHOULDER JOINT Left 11/26/2024          Current Outpatient Medications   Medication Instructions    alirocumab (PRALUENT PEN) 75 mg/mL PnIj Inject into the skin.    ascorbic acid (vitamin C) (VITAMIN C) 100 mg, Daily    aspirin 81 mg    bethanechol (URECHOLINE) 25 mg, Oral, 3 times daily    clopidogreL (PLAVIX) 75 mg tablet daily.    COQ-10 200 mg, 2 times daily    cyclobenzaprine (FLEXERIL) 10 mg, 3 times daily PRN    doxycycline (VIBRAMYCIN) 100 mg, 2 times daily    KLOR-CON M20 20 mEq tablet 20 mEq, Daily    levothyroxine (SYNTHROID) 100 mcg    losartan (COZAAR) 100 mg    meloxicam (MOBIC) 15 MG tablet     omega-3 fatty acids/fish oil (FISH OIL-OMEGA-3 FATTY ACIDS) 300-1,000 mg capsule Daily    plecanatide (TRULANCE) 3 mg    tadalafiL (CIALIS) 20 mg    testosterone  cypionate (DEPOTESTOTERONE CYPIONATE) 200 mg    torsemide (DEMADEX) 20 mg, Daily    traZODone (DESYREL) 100 mg, Nightly         ..Social History[1]       ..  Family History   Problem Relation Name Age of Onset    Hyperlipidemia Mother      Hypertension Mother      Heart disease Father      Hyperlipidemia Father      Hypertension Father      Peripheral vascular disease Father      Hypertension Sister      Rheum arthritis Sister            ..  Review of patient's allergies indicates:   Allergen Reactions    Cortisone Other (See Comments)     Other reaction(s): Hiccups  hiccups      Amlodipine      Other reaction(s): Cough    Evolocumab      Other reaction(s): Body fluid retention, Fatigue, Sedation  Other reaction(s): Body fluid retention, Fatigue, Sedation      Ezetimibe Other (See Comments)     Other reaction(s): Severe depression  Depression      Lisinopril      Other reaction(s): Cough    Statins-hmg-coa reductase inhibitors      Other reaction(s): Myalgia caused by statin (finding)  Other reaction(s): Myalgia caused by statin          ..  Immunization History   Administered Date(s) Administered    Dtap, Unspecified Formulation 06/14/2017    Influenza 10/01/2016, 01/03/2018, 10/30/2018, 02/12/2020, 10/01/2020    Influenza - Quadrivalent 10/04/2019, 04/05/2022    Influenza - Quadrivalent - PF *Preferred* (6 months and older) 10/25/2013, 11/26/2016    Influenza - Trivalent - Afluria, Fluzone MDV 10/25/2013    Influenza - Trivalent - Fluarix, Flulaval, Fluzone, Afluria - PF 10/25/2013, 11/26/2016, 10/04/2019, 10/15/2020    Pneumococcal Conjugate - 20 Valent 05/09/2022    Pneumococcal Polysaccharide - 23 Valent 12/09/2015, 12/09/2015, 07/02/2018    Zoster Recombinant 12/31/2020, 07/22/2021          REVIEW OF SYSTEMS:    GENERAL: No weight loss, no weight gain, no fever, no fatigue, no chills, no night sweats  HEENT: No sore throat, no ear pain, no sinus pressure, no nasal congestion, no rhinorrhea, no decreased  "hearing, no snoring  VISION: No vision changes, no blurry vision, no double vision, no glaucoma, no cataracts, glasses, contacts  LAST EYE EXAM:  NECK: no LAD  CARDIAC: No chest pain, no palpitations, no dyspnea on exertion, no orthopnea  RESPIRATORY: No cough, no wheezing, no sputum production, no SOB  GI: No abdominal pain, no N/V, no constipation, no diarrhea, no blood in stool, ( ) family history of Colon Ca  : No dysuria, no hematuria, no frequency, no urgency, no incontinence, no testicular pain/swelling, ( ) family history of Prostate Ca  MUSC/SKEL: No myalgia, no weakness, no edema, no arthralgia, no joint swelling/effusions  SKIN: No rashes, no hives, no itching, no sores  NEURO: No HA, no numbness, no tingling, no weakness, no dizziness  PSYCH: No anxiety, no depression, no irritability, no panic attacks, no s/i, no h/i, no hallucinations  ENDO: No polyuria, no polyphagia, no polydipsia  HEME: No bruising, no bleeding disorders, no signs of anemia.     Physical Exam  PHYSICAL EXAM:    ..Visit Vitals  /73   Pulse 85   Temp 98.3 °F (36.8 °C)   Ht 5' 5" (1.651 m)   Wt 107.4 kg (236 lb 11.2 oz)   SpO2 95%   BMI 39.39 kg/m²        General: Well developed, well nourished in no apparent distress, alert and oriented x3  Skin: No rash or abnormal lesions  HEENT: Normocephalic, PERRLA, EOMI, mouth WNL, throat WNL, nares normal, EAC and TM WNL bilaterally  Neck: FROM, no LAD, no thyroid abnormalities palpable  Chest: CTA bilaterally, no wheezes crackles or rubs  Cardiac: RRR, no murmurs, rubs, gallops  Abdomen: Soft, nontender, nondistended, NBSx4, no rebound tenderness or guarding, no HSM  Extremities: No clubbing, cyanosis, or edema. Joints WNL, +2 DP/PT pulses bilaterally  Neuro: No sensory or motor defects noted. CN II-XII intact. Gait WNL.  Genital: normal testes, no hernia  Rectal: no hemorrhoids or fissures, prostate soft/smooth without enlargement       1. Encounter for wellness examination in " adult    2. Essential (primary) hypertension  Overview:  Controlled; continue losartan 100 mg daily.  Followed by cardio, Dr Mckenzie.      3. Hypercholesteremia  Overview:  07/02/2024:  Patient has been intolerant of statins and Repatha.  Patient is about to start another medication.      4. Stenosis of carotid artery, unspecified laterality    5. Male hypogonadism  Overview:  See ' Hormone replacement therapy '    07/02/2024:  Patient is currently on testosterone 0.6 cc IM weekly.    Lab orders given; patient will have drawn at [facility].       6. Hypertensive heart disease, unspecified whether heart failure present    7. Hypothyroidism, unspecified type         ..No follow-ups on file.     No future appointments.       [1]   Social History  Socioeconomic History    Marital status:     Number of children: 1   Occupational History    Occupation: medically retired   Tobacco Use    Smoking status: Never     Passive exposure: Never    Smokeless tobacco: Never   Substance and Sexual Activity    Alcohol use: Not Currently    Drug use: Never    Sexual activity: Yes     Partners: Female     Social Drivers of Health     Financial Resource Strain: Low Risk  (2/20/2025)    Overall Financial Resource Strain (CARDIA)     Difficulty of Paying Living Expenses: Not hard at all   Food Insecurity: No Food Insecurity (2/20/2025)    Hunger Vital Sign     Worried About Running Out of Food in the Last Year: Never true     Ran Out of Food in the Last Year: Never true   Transportation Needs: No Transportation Needs (2/20/2025)    PRAPARE - Transportation     Lack of Transportation (Medical): No     Lack of Transportation (Non-Medical): No   Physical Activity: Sufficiently Active (2/20/2025)    Exercise Vital Sign     Days of Exercise per Week: 5 days     Minutes of Exercise per Session: 60 min   Stress: No Stress Concern Present (2/20/2025)    [employer] - Occupational Stress Questionnaire     Feeling of  Stress : Not at all   Housing Stability: Low Risk  (2/20/2025)    Housing Stability Vital Sign     Unable to Pay for Housing in the Last Year: No     Homeless in the Last Year: No

## 2025-06-25 ENCOUNTER — TELEPHONE (OUTPATIENT)
Dept: PRIMARY CARE CLINIC | Facility: CLINIC | Age: 55
End: 2025-06-25
Payer: OTHER GOVERNMENT

## 2025-06-25 NOTE — TELEPHONE ENCOUNTER
Copied from CRM #[id number]. Topic: Appointments - Appointment Scheduling  >> Jun 25, 2025 12:06 PM Marguerite Wolf wrote:  Who Called: Landry Hudson    Caller is requesting assistance/information from provider's office.    Symptoms (please be specific):    How long has patient had these symptoms:    List of preferred pharmacies on file (remove unneeded): [unfilled]  If different, enter pharmacy into here including location and phone number:       Preferred Method of Contact: Phone Call  Patient's Preferred Phone Number on File: [phone]   Best Call Back Number, if different:  Additional Information: pt is wanting to retest his hormones with same test as last time at [facility] laboratory. Sensitive E2 estrogen hormone. Please advise

## 2025-07-01 NOTE — PROGRESS NOTES
"Annual Exam (Labs done at [facility])       HPI:    Patient presents for follow-up and to update me on his condition.  Patient had spinal surgery 3 years ago complicated by postoperative infection.  Patient had to be reopened for an infection to be cleaned out and he was on IV antibiotics for months.  Patient finally recovered.   Patient had a left total shoulder replacement in November 2024 and plans on doing a right 1 soon.  He had great results with his shoulder replacement.      Current Outpatient Medications   Medication Instructions    ascorbic acid (vitamin C) (VITAMIN C) 100 mg, Daily    bethanechol (URECHOLINE) 25 mg, Oral, 3 times daily    clopidogreL (PLAVIX) 75 mg tablet daily.    COQ-10 200 mg, 2 times daily    cyclobenzaprine (FLEXERIL) 10 mg, 3 times daily PRN    KLOR-CON M20 20 mEq tablet 20 mEq, Daily    levothyroxine (SYNTHROID) 100 mcg    meloxicam (MOBIC) 15 MG tablet     omega-3 fatty acids/fish oil (FISH OIL-OMEGA-3 FATTY ACIDS) 300-1,000 mg capsule Daily    plecanatide (TRULANCE) 3 mg    tadalafiL (CIALIS) 20 mg    testosterone cypionate (DEPOTESTOTERONE CYPIONATE) 200 mg    torsemide (DEMADEX) 20 mg, Daily    traZODone (DESYREL) 100 mg, Nightly         ROS:    Labs 02/13/2025 performed at [facility]:   CMP: Creatinine 1.18.  AST 69, ALT 43.  Total cholesterol 139, HDL 29, triglycerides 77 and LDL 93.    Hemoglobin/hematocrit:  14.6/46.3.  TSH 1.700, free T4 1.37.    Total testosterone 2008.  Free testosterone 638.3.  Estradiol 104.1      PE:    ..Visit Vitals  /73   Pulse 85   Temp 98.3 °F (36.8 °C)   Ht 5' 5" (1.651 m)   Wt 107.4 kg (236 lb 11.2 oz)   SpO2 95%   BMI 39.39 kg/m²      General: He is a well-developed well-nourished stocky white male in no apparent distress.  He is alert and oriented.  Chest: Clear to auscultation bilaterally.    CV: Regular rate and rhythm without murmurs rubs or gallops.  Bilateral lower extremities: Without edema.          1. Male hypogonadism  Overview:  See ' " Hormone replacement therapy '    07/02/2024:  Patient is currently on testosterone 0.6 cc IM weekly.    Lab orders given; patient will have drawn at [facility].     Assessment & Plan:  02/20/2025:  Labs reviewed.  Patient is doing well on therapy.      2. Essential (primary) hypertension  Overview:  Controlled; continue losartan 100 mg daily.  Followed by cardio, Dr Mckenzie.    Assessment & Plan:  02/20/2025: Patient's blood pressure is well controlled without medication.      3. Acquired hypothyroidism  Overview:  02/20/2025:  TSH 1.70, free T4 1.37.    Continue current dosage of levothyroxine.      4. Hypertensive heart disease without heart failure  Overview:  02/20/2025:  Well controlled; patient is on Demadex 20 mg daily.                ..No follow-ups on file.       Future Appointments   Date Time Provider Department Center   7/16/2025 11:00 AM Tyler Cheng MD [facility] [place]

## 2025-07-10 ENCOUNTER — TELEPHONE (OUTPATIENT)
Dept: FAMILY MEDICINE | Facility: CLINIC | Age: 55
End: 2025-07-10
Payer: OTHER GOVERNMENT

## 2025-07-10 DIAGNOSIS — Z00.00 WELLNESS EXAMINATION: Primary | ICD-10-CM

## 2025-07-10 NOTE — TELEPHONE ENCOUNTER
Copied from CRM #[id number]. Topic: Appointments - Appointment Rescheduling  >> Jul 10, 2025 10:30 AM Elda wrote:  Who Called: Landry Hudson    Caller is requesting assistance/information from provider's office.    Symptoms (please be specific):    How long has patient had these symptoms:    List of preferred pharmacies on file (remove unneeded): [unfilled]  If different, enter pharmacy into here including location and phone number:        Patient's Preferred Phone Number on File: [phone]   Best Call Back Number, if different:  Additional Information: pt req labs are faxed to [facility] on [address] , pt is heading to location to complete labs now (pt is fasting )

## 2025-07-12 NOTE — PROGRESS NOTES
"LAB REVIEW       HPI:    Patient presents for follow-up/discussion of lab results.  Labs:  Creatinine 1.30.  Lipid profile: Total cholesterol 123, HDL 33, triglycerides 60 and LDL 76.  Total tests 2216.  SHBG 24.5.  Free testosterone 729.7.  Estradiol level 135.      Current Outpatient Medications   Medication Instructions    ascorbic acid (vitamin C) (VITAMIN C) 100 mg, Daily    bethanechol (URECHOLINE) 25 mg, Oral, 3 times daily    clopidogreL (PLAVIX) 75 mg tablet daily.    COQ-10 200 mg, 2 times daily    cyclobenzaprine (FLEXERIL) 10 mg, 3 times daily PRN    KLOR-CON M20 20 mEq tablet 20 mEq, Daily    levothyroxine (SYNTHROID) 100 mcg    meloxicam (MOBIC) 15 MG tablet     omega-3 fatty acids/fish oil (FISH OIL-OMEGA-3 FATTY ACIDS) 300-1,000 mg capsule Daily    plecanatide (TRULANCE) 3 mg    tadalafiL (CIALIS) 20 mg    testosterone cypionate (DEPOTESTOTERONE CYPIONATE) 200 mg    torsemide (DEMADEX) 20 mg, Daily    traZODone (DESYREL) 100 mg, Nightly         ROS:    Patient continues to have issues with bad constipation.  Patient gets very distended and bloated at times resulting in shortness for breath.  Patient has been taking Trulance.  He is currently trying Motegrity which he obtained from a pharmacy in [place].  Patient self caths 6 times a day.    Patient had a follow-up with Dr. Mckenzie recently; stable.     Patient is scheduled for right total shoulder replacement on 10/21/2025      PE:    ..Visit Vitals  /67   Pulse 90   Temp 98.5 °F (36.9 °C)   Ht 5' 5" (1.651 m)   Wt 105.6 kg (232 lb 14.4 oz)   SpO2 96%   BMI 38.76 kg/m²        General:  He is well-developed well-nourished stocky white male in no apparent distress.  He is alert and oriented.  His affect is appropriate.  Chest: Clear to auscultation bilaterally.    CV: Regular rate rhythm without murmurs rubs or gallops.  Abdomen: Firm, nontender        1. Male hypogonadism  Overview:  See ' Hormone replacement therapy '    07/02/2024:  Patient is " currently on testosterone 0.6 cc IM weekly.    Lab orders given; patient will have drawn at [facility].     Assessment & Plan:  Patient is doing well on hormone replacement therapy.  See HPI for labs.      2. Essential (primary) hypertension  Overview:  Controlled; continue losartan 100 mg daily.  Followed by cardio, Dr Mckenzie.      3. Hypercholesteremia  Overview:  07/02/2024:  Patient has been intolerant of statins and Repatha.  Patient is about to start another medication.    Assessment & Plan:  07/16/2025: Lipid profile:  Total cholesterol 123, HDL 33, triglycerides 60 and LDL 76.      4. Hypertensive heart disease without heart failure  Overview:  02/20/2025:  Well controlled; patient is on Demadex 20 mg daily.    Assessment & Plan:  07/16/2025: Stable; followed by Dr. Mckenzie.       5. Bilateral carotid artery stenosis  Overview:  07/16/2025: Stable; followed by Dr. Mckenzie.       6. Urinary retention  Overview:  07/02/2024: Patient has urinary retention secondary to back surgery with postoperative infection.    Patient self caths.    Assessment & Plan:  Patient self caths 6 times a day.      7. Constipation due to neurogenic bowel  Overview:  07/16/2025:  Patient has been taking Trulance for over 2 years.    Patient is currently trying Motegrity from a pharmacy in [place]; this is his 3rd week on medication.  This medication is not on [facility]'s approved list.  Patient has been discussing his constipation with Dr. Taisha Romero.                   ..No follow-ups on file.       No future appointments.

## 2025-07-16 ENCOUNTER — OFFICE VISIT (OUTPATIENT)
Dept: PRIMARY CARE CLINIC | Facility: CLINIC | Age: 55
End: 2025-07-16
Payer: OTHER GOVERNMENT

## 2025-07-16 VITALS
DIASTOLIC BLOOD PRESSURE: 67 MMHG | SYSTOLIC BLOOD PRESSURE: 132 MMHG | BODY MASS INDEX: 38.8 KG/M2 | HEIGHT: 65 IN | TEMPERATURE: 99 F | WEIGHT: 232.88 LBS | OXYGEN SATURATION: 96 % | HEART RATE: 90 BPM

## 2025-07-16 DIAGNOSIS — K59.2 CONSTIPATION DUE TO NEUROGENIC BOWEL: ICD-10-CM

## 2025-07-16 DIAGNOSIS — R33.9 URINARY RETENTION: ICD-10-CM

## 2025-07-16 DIAGNOSIS — K59.00 CONSTIPATION DUE TO NEUROGENIC BOWEL: ICD-10-CM

## 2025-07-16 DIAGNOSIS — I10 ESSENTIAL (PRIMARY) HYPERTENSION: ICD-10-CM

## 2025-07-16 DIAGNOSIS — E29.1 MALE HYPOGONADISM: Primary | ICD-10-CM

## 2025-07-16 DIAGNOSIS — E78.00 HYPERCHOLESTEREMIA: ICD-10-CM

## 2025-07-16 DIAGNOSIS — I65.23 BILATERAL CAROTID ARTERY STENOSIS: ICD-10-CM

## 2025-07-16 DIAGNOSIS — I11.9 HYPERTENSIVE HEART DISEASE WITHOUT HEART FAILURE: ICD-10-CM

## 2025-07-16 PROCEDURE — 99214 OFFICE O/P EST MOD 30 MIN: CPT | Mod: ,,, | Performed by: FAMILY MEDICINE
